# Patient Record
Sex: FEMALE | URBAN - METROPOLITAN AREA
[De-identification: names, ages, dates, MRNs, and addresses within clinical notes are randomized per-mention and may not be internally consistent; named-entity substitution may affect disease eponyms.]

---

## 2018-04-17 ENCOUNTER — ALLIED HEALTH/NURSE VISIT (OUTPATIENT)
Dept: CARDIOLOGY | Facility: CLINIC | Age: 58
End: 2018-04-17
Attending: PSYCHIATRY & NEUROLOGY
Payer: COMMERCIAL

## 2018-04-17 ENCOUNTER — OFFICE VISIT (OUTPATIENT)
Dept: NEUROLOGY | Facility: CLINIC | Age: 58
End: 2018-04-17
Payer: COMMERCIAL

## 2018-04-17 VITALS
WEIGHT: 241.5 LBS | HEART RATE: 88 BPM | HEIGHT: 64 IN | SYSTOLIC BLOOD PRESSURE: 142 MMHG | DIASTOLIC BLOOD PRESSURE: 87 MMHG | BODY MASS INDEX: 41.23 KG/M2

## 2018-04-17 DIAGNOSIS — R55 PRE-SYNCOPE: ICD-10-CM

## 2018-04-17 DIAGNOSIS — R55 PRE-SYNCOPE: Primary | ICD-10-CM

## 2018-04-17 RX ORDER — ALBUTEROL SULFATE 90 UG/1
1-2 AEROSOL, METERED RESPIRATORY (INHALATION) EVERY 4 HOURS PRN
COMMUNITY
Start: 2018-03-02 | End: 2019-03-02

## 2018-04-17 RX ORDER — EPINEPHRINE 0.3 MG/.3ML
0.3 INJECTION SUBCUTANEOUS
COMMUNITY
Start: 2017-09-28

## 2018-04-17 RX ORDER — IBUPROFEN 200 MG
TABLET ORAL
COMMUNITY

## 2018-04-17 ASSESSMENT — ENCOUNTER SYMPTOMS
DISTURBANCES IN COORDINATION: 0
MUSCLE CRAMPS: 0
DYSURIA: 0
LIGHT-HEADEDNESS: 1
DIZZINESS: 1
ORTHOPNEA: 0
MYALGIAS: 0
DEPRESSION: 0
MEMORY LOSS: 0
DECREASED APPETITE: 0
NIGHT SWEATS: 1
SNORES LOUDLY: 0
EYE IRRITATION: 1
POLYDIPSIA: 0
COUGH: 1
SWOLLEN GLANDS: 0
BLOOD IN STOOL: 0
FEVER: 0
VOMITING: 0
TINGLING: 0
NAUSEA: 1
HYPERTENSION: 0
INSOMNIA: 1
WEIGHT LOSS: 0
TROUBLE SWALLOWING: 0
EYE PAIN: 1
FLANK PAIN: 0
ARTHRALGIAS: 1
SPEECH CHANGE: 0
ABDOMINAL PAIN: 1
FATIGUE: 1
BACK PAIN: 1
ALTERED TEMPERATURE REGULATION: 1
PALPITATIONS: 0
PARALYSIS: 0
POLYPHAGIA: 0
EYE WATERING: 1
DIARRHEA: 1
SINUS CONGESTION: 1
LEG PAIN: 1
WEAKNESS: 1
SORE THROAT: 0
DYSPNEA ON EXERTION: 1
TASTE DISTURBANCE: 0
EXERCISE INTOLERANCE: 0
INCREASED ENERGY: 0
HYPOTENSION: 0
DECREASED CONCENTRATION: 1
NUMBNESS: 0
MUSCLE WEAKNESS: 0
SKIN CHANGES: 0
JOINT SWELLING: 1
POSTURAL DYSPNEA: 0
POOR WOUND HEALING: 1
HOARSE VOICE: 0
NERVOUS/ANXIOUS: 1
NAIL CHANGES: 0
LOSS OF CONSCIOUSNESS: 0
NECK MASS: 1
SMELL DISTURBANCE: 0
NECK PAIN: 1
STIFFNESS: 1
HALLUCINATIONS: 0
PANIC: 0
JAUNDICE: 0
SLEEP DISTURBANCES DUE TO BREATHING: 0
CHILLS: 1
SPUTUM PRODUCTION: 1
COUGH DISTURBING SLEEP: 1
CONSTIPATION: 0
HEARTBURN: 0
RECTAL PAIN: 0
BOWEL INCONTINENCE: 1
SHORTNESS OF BREATH: 0
WEIGHT GAIN: 0
EYE REDNESS: 1
HEMATURIA: 0
DIFFICULTY URINATING: 1
TREMORS: 0
SEIZURES: 0
HEADACHES: 1
SINUS PAIN: 1
BLOATING: 1
DOUBLE VISION: 0
SYNCOPE: 1
BRUISES/BLEEDS EASILY: 1
HEMOPTYSIS: 0
WHEEZING: 0

## 2018-04-17 ASSESSMENT — PAIN SCALES - GENERAL: PAINLEVEL: MILD PAIN (2)

## 2018-04-17 NOTE — LETTER
"4/17/2018       RE: Debi Young  [address] [place] [address]     Dear Colleague,    Thank you for referring your patient, Debi Young, to the [facility] at [facility]. Please see a copy of my visit note below.    Service Date: 04/17/2018      REASON FOR VISIT:  Debi Young was referred for neurologic consultation on 04/17/2018.  Her chief complaint is that of recurrent spells.      HISTORY OF PRESENT ILLNESS:  The patient has had spells now over at least 2 years.  She described intermittent \"cold sweats.\"  She has a feeling as if she would \"black out\" with the spells.  These can occur intermittently but they are not daily.  She said that she has had to \"catch herself\" at times if she is upright.  This has been inconsistent.  Despite that, she does not feel weak.  The spells last anywhere from 5-30 minutes.  She cannot think of anything that provokes them.  She did tell me that if she has one she has to breathe better and just wait.  She said they typically last between 5 and 30 minutes.  She could not really tell me if she has a sense of spinning with them, but she has a feeling as if her \"body would go.\"  She has never associated her spells then with true vertigo.  She has not had them associated with fasting.  They do not relate directly to eating.  She does not have associated headache, upper respiratory tract infection, diplopia, oscillopsia, visual obscuration, but she has noted she has a feeling as if she is moving to the left at times.  She has had this for 3-4 days over the last 2 months.  It was hard for her to describe this also.  She has noted though a sensation the last 6 months with what she said were \"eye floaters\" or \"light flashes.\"  She denied any true scintillating scotomata.  The patient has not had any history of hypertension.  She insists that her spells have generally not been orthostatic.  She said that is a different " feeling.  She does feel chronically tired, but she does not snore.  She does suffer from obesity.  She has had a history of lower back pain.  The patient has had also issues with allergies in general and has taken allergy shots but these have not helped her complaints.  Review through the [employer] website of her problems indicate that she has had chronic allergic rhinitis and specific allergies to cats, dust mites and molds.  She has prediabetes.  The patient has had prior streptococcal pharyngitis.  She does have chronic leg edema.  The patient has a low HDL.  She has had an anaphylactic reaction on 06/27/2017.  The patient did have a basal cell cancer removed from her back in 2017.  She has had a history of chronic urticaria.  She also has had diarrheal complaints.  She has mixed urge and stress incontinence.  The patient does have synovial cysts involving both popliteal spaces.  She did tell me that she has had asthma as a child.  She did weigh in high school 125 pounds.  She is 5 feet 4 inches tall.  She said that her highest weight has been 252 pounds and she has tried to diet down to 241 pounds.  She does carry an EpiPen.  The patient has not had to use it.  She does take p.r.n. rare ibuprofen for pain.  She has been told to take low dose aspirin, but she tends to forget it.  The patient has been treated with albuterol at times when she has had a cough which is not common.      ALLERGIES:   She has allergies listed to adhesive tape, amoxicillin, Augmentin, Bactrim, contrast dye, erythromycin, naproxen and prednisone which did cause her to have anaphylaxis on 05/30/2017.        FAMILY HISTORY:   The patient's family history is significant in that 1 brother has hereditary spastic paraplegia.  She said a sister has had psoriasis and breast cancer.  The patient's father is alive and has had hypertension as is her mother who is alive in her 80s also.        The patient has never smoked.  She rarely uses alcohol.  She  did have a prior needle aspiration of a nodule on 04/10/2018 of the thyroid.  This was uncomplicated.  There was no evidence of malignancy.  The patient has a known multinodular thyroid.  She had carotid ultrasound done on 03/26/2018.  This was basically unremarkable.  She had nonocclusive proximal bilateral ICA plaque.  The patient was found at that time to have hypoechoic isthmus thyroid nodule incidentally found.  She had an MRI scan of the brain which I reviewed with her also on 03/26/2018.  This showed normal intracranial contents.  She had diffuse T1 hypointensity throughout the calvarium, which may related to red marrow reconversion.  The radiologist said this could relate to chronic anemia, heart failure, or heavy smoking, polycythemia vera, granulomatous process or hematologic malignancy.  There was an incidental note of a prominent perivascular space involving the left putamen.  The patient did have a mild amount of fluid involving the right mastoid air cells which relate to mastoiditis and chronic eustachian tube dysfunction.  The patient did have a study with and without contrast.  The patient did have a chest x-ray which showed no abnormality.  Her magnesium level was 2.3 on 03/21/2018.  She had normal or negative troponins then and her complete blood count was completely unremarkable.  She also had a normal metabolic profile.  Her TSH level was 1.37.  She was checked on 03/02/2018 for influenza and had negative serology for it.      The patient has not consistently taken any medication nor supplement.  She has normal diet and she denied any significant change and bowel habits.  She has not had any actual anabel menopausal symptoms.  The patient did tell me that she went through menopause at age 49.      PHYSICAL EXAMINATION:  Neurologic examination was completely unremarkable.  This included gait, station, cerebellar testing, muscle stretch reflexes, plantar stimulation, strength, cranial nerve  "examination, superficial and cortical sensory testing are all unremarkable.  I could not auscultate cervical or cerebral bruits.  She had a regular cardiac rhythm.  The patient had a negative Nylen-Barany test.     VITAL SIGNS:   Her blood pressure supine was 151/73 with a pulse of 78.  Seated it was 143/80 with a pulse of 76 and standing it was 161/87 with a pulse of 83, waiting 3 minutes be between each blood pressure determination.     She had no cervical bruits and a regular cardiac rhythm without gallops or murmurs.      IMPRESSION:  Unusual spells which do seem to relate to a \"cold and sweaty sensation.\"      The patient has had unusual spells.  I have ordered VMAs metanephrines.  She will have a Holter monitor placed and I have asked for formal Cardiology consultation.  She is going to send her MRI scans here for review, so the abnormal skull portion of the MRI scan could be reviewed by one of our neuroradiologists.  I told her I would be happy to see her again, but I did not feel that she probably had a neurologic cause for her symptoms.  She did wonder too if she possibly is premenopausal.  I did talk with her about possibly having endocrine consultation here.      Thank you for allowing me to see this patient.        I spent 1 hour with her.  Over 50% of the time this involved counseling and coordination of care.  A complete review of medical systems was done and a positive review is listed in the report above.        cc:   [address]   [place]   [address]       Addendum: Our neuroradiologist reviewed abnormal calvarium on recent MRI and agrees with report and recommends hematology oncology consult at [facility]           D: 2018   T: 2018   MT: SARAH      Name:     TRAVIS LUNA   MRN:      [id number]        Account:      [id number]   :      1960           Service Date: 2018      Document: [id number]        Again, thank you for allowing me to " participate in the care of your patient.      Sincerely,    Ronan Griggs MD

## 2018-04-17 NOTE — MR AVS SNAPSHOT
After Visit Summary   4/17/2018    Debi Young    MRN: [id number]           Patient Information     Date Of Birth          1960        Visit Information        Provider Department      4/17/2018 7:00 AM Ronan Griggs MD [facility] Neurology        Today's Diagnoses     Pre-syncope    -  1       Follow-ups after your visit        Additional Services     CARDIOLOGY EVAL ADULT REFERRAL       Preferred location:  [facility] [phone]   [email or web address] stress test and tilt table    Please be aware that coverage of these services is subject to the terms and limitations of your health insurance plan.  Call member services at your health plan with any benefit or coverage questions.      Please bring the following to your appointment:  Any x-rays, CTs or MRIs which have been performed. Contact the facility where they were done to arrange for  prior to your scheduled appointment.    List of current medications  This referral request   Any documents/labs given to you for this referral                  Your next 10 appointments already scheduled     Apr 17, 2018 10:30 AM CDT   Holter Monitor with MG DEVICE RM, MG CV TECH   [facility]    [address] [place] [id number] [phone]            Apr 30, 2018  2:30 PM CDT   New Visit with Eugene Collier MD   [facility]    [id number] [place] [id number] [phone]              Future tests that were ordered for you today     Open Future Orders        Priority Expected Expires Ordered    Holter monitor 48 hour - Adult Routine 4/17/2018 10/14/2018 4/17/2018    Metanephrine random or 24 hr urine Routine 4/17/2018 5/30/2018 4/17/2018    Catecholamines fractioned free urine Routine  4/18/2019  "2018    VMA urine Routine  2019            Who to contact     Please call your clinic at [phone] to:    Ask questions about your health    Make or cancel appointments    Discuss your medicines    Learn about your test results    Speak to your doctor            Additional Information About Your Visit        MyChart Information     [employer] is an electronic gateway that provides easy, online access to your medical records. With [employer], you can request a clinic appointment, read your test results, renew a prescription or communicate with your care team.     To sign up for [employer] visit the website at [email or web address]   You will be asked to enter the access code listed below, as well as some personal information. Please follow the directions to create your username and password.     Your access code is: [email or web address]  Expires: 2018  6:31 AM     Your access code will  in 90 days. If you need help or a new code, please contact your [facility] or call [phone] for assistance.        Care EveryWhere ID     This is your Care EveryWhere ID. This could be used by other organizations to access your [place] medical records  [id number]        Your Vitals Were     Pulse Height BMI (Body Mass Index)             88 1.626 m (5' 4\") 41.45 kg/m2          Blood Pressure from Last 3 Encounters:   18 142/87    Weight from Last 3 Encounters:   18 109.5 kg (241 lb 8 oz)              We Performed the Following     CARDIOLOGY EVAL ADULT REFERRAL        Primary Care Provider    None Specified       No primary provider on file.        Equal Access to Services     [facility]: Hadii aad ku hadasho Soomaali, waaxda luqadaha, qaybta kaalmada adeegyada, mirna kent . So [facility] [phone].    ATENCIÓN: Si habla español, tiene a garcia disposición servicios gratuitos de asistencia lingüística. Llame al [phone].    We comply " with applicable federal civil rights laws and [place] laws. We do not discriminate on the basis of race, color, national origin, age, disability, sex, sexual orientation, or gender identity.            Thank you!     Thank you for choosing [facility]UR[facility]  for your care. Our goal is always to provide you with excellent care. Hearing back from our patients is one way we can continue to improve our services. Please take a few minutes to complete the written survey that you may receive in the mail after your visit with us. Thank you!             Your Updated Medication List - Protect others around you: Learn how to safely use, store and throw away your medicines at www.disposemymeds.org.          This list is accurate as of 4/17/18  8:13 AM.  Always use your most recent med list.                   Brand Name Dispense Instructions for use Diagnosis    albuterol 108 (90 Base) MCG/ACT Inhaler    PROAIR HFA/PROVENTIL HFA/VENTOLIN HFA     Inhale 1-2 puffs into the lungs every 4 hours as needed        EPINEPHrine 0.3 MG/0.3ML injection 2-pack    EPIPEN/ADRENACLICK/or ANY BX GENERIC EQUIV     Inject 0.3 mg into the muscle        ibuprofen 200 MG tablet    ADVIL/MOTRIN

## 2018-04-19 ENCOUNTER — PRE VISIT (OUTPATIENT)
Dept: CARDIOLOGY | Facility: CLINIC | Age: 58
End: 2018-04-19

## 2018-04-19 DIAGNOSIS — R55 PRE-SYNCOPE: ICD-10-CM

## 2018-04-19 PROCEDURE — 99000 SPECIMEN HANDLING OFFICE-LAB: CPT | Performed by: PSYCHIATRY & NEUROLOGY

## 2018-04-19 PROCEDURE — 83835 ASSAY OF METANEPHRINES: CPT | Mod: 90 | Performed by: PSYCHIATRY & NEUROLOGY

## 2018-04-19 PROCEDURE — 84585 ASSAY OF URINE VMA: CPT | Performed by: PSYCHIATRY & NEUROLOGY

## 2018-04-19 PROCEDURE — 82384 ASSAY THREE CATECHOLAMINES: CPT | Mod: 90 | Performed by: PSYCHIATRY & NEUROLOGY

## 2018-04-19 NOTE — TELEPHONE ENCOUNTER
Left message for patient asking her to call back and complete pre visit phone call.Patient returned holter monitor today to [facility] CHRIS.     PREVISIT INFORMATION                                                    Debi Young scheduled for future visit at [facility] specialty clinics.    Patient is scheduled to see Amira on 4/30/18  Reason for visit: pre syncope  Referring provider Dr Griggs  Has patient seen previous specialist? No  Medical Records:  Available in chart.  Patient was previously seen at a [facility] or [facility] facility.    REVIEW                                                      New patient packet mailed to patient: No  Medication reconciliation complete: Yes      Current Outpatient Prescriptions   Medication Sig Dispense Refill     albuterol (PROAIR HFA/PROVENTIL HFA/VENTOLIN HFA) 108 (90 Base) MCG/ACT Inhaler Inhale 1-2 puffs into the lungs every 4 hours as needed       EPINEPHrine (EPIPEN/ADRENACLICK/OR ANY BX GENERIC EQUIV) 0.3 MG/0.3ML injection 2-pack Inject 0.3 mg into the muscle       ibuprofen (ADVIL/MOTRIN) 200 MG tablet          Allergies: Prednisone; Naproxen; No clinical screening - see comments; Tape [adhesive tape]; Amoxicillin-pot clavulanate; Contrast dye; Erythromycin; Sulfa drugs; and Sulfamethoxazole-trimethoprim        PLAN/FOLLOW-UP NEEDED                                                      Previsit review complete.  Patient will see provider at future scheduled appointment.     Patient Reminders Given:  If you need to cancel or reschedule,please call [phone].  Clinic location reviewed.    Guerline Hensley

## 2018-04-20 LAB — VMA/CREAT UR: 3.1 MG/G CR (ref 0–8.1)

## 2018-04-21 LAB
COLLECT DURATION TIME UR: 24 HR
CREAT 24H UR-MRATE: 1170 MG/D (ref 500–1400)
CREAT SERPL-MCNC: 72 MG/DL
METANEPH 24H UR-MCNC: 44 UG/L
METANEPH 24H UR-MRATE: 72 UG/D (ref 39–143)
METANEPH+NORMETANEPH UR-IMP: NORMAL
METANEPH/CREAT 24H UR: 61 UG/G CRT (ref 0–300)
NORMETANEPHRINE 24H UR-MCNC: 228 UG/L
NORMETANEPHRINE 24H UR-MRATE: 370 UG/D (ref 109–393)
NORMETANEPHRINE/CREAT 24H UR: 317 UG/G CRT (ref 0–400)
SPECIMEN VOL ?TM UR: 1625 ML

## 2018-04-23 LAB
CATECHOLS UR-IMP: NORMAL
COLLECT DURATION TIME UR: 24 HR
CREAT 24H UR-MRATE: 1170 MG/D (ref 500–1400)
CREAT UR-MCNC: 72 MG/DL
DOPAMINE 24H UR-MRATE: 224 UG/D (ref 77–324)
DOPAMINE UR-MCNC: 2 UG/L
DOPAMINE/CREAT UR: 192 UG/G CRT (ref 0–250)
EPINEPH 24H UR-MRATE: 3 UG/D (ref 1–7)
EPINEPH UR-MCNC: 138 UG/L
EPINEPH/CREAT UR: 3 UG/G CRT (ref 0–20)
NOREPINEPH 24H UR-MRATE: 52 UG/D (ref 16–71)
NOREPINEPH UR-MCNC: 32 UG/L
NOREPINEPH/CREAT UR: 44 UG/G CRT (ref 0–45)
SPECIMEN VOL ?TM UR: 1625 ML

## 2018-04-24 LAB — INTERPRETATION MONITOR -MUSE: NORMAL

## 2018-04-25 ENCOUNTER — CARE COORDINATION (OUTPATIENT)
Dept: NEUROLOGY | Facility: CLINIC | Age: 58
End: 2018-04-25

## 2018-04-25 ENCOUNTER — HEALTH MAINTENANCE LETTER (OUTPATIENT)
Age: 58
End: 2018-04-25

## 2018-04-25 NOTE — PROGRESS NOTES
Ronan Griggs MD McAllister, Angela, SUMMER                   It looks like urine catecholamines are okay.       Ronan Griggs MD McAllister, Angela, SUMMER                   Please tell her Holter essentially normal; didn't correlate to sx's.         4/25/18: called patient with above results. Patient verbalized understanding. She has no further questions or concerns at this time.

## 2018-04-30 ENCOUNTER — OFFICE VISIT (OUTPATIENT)
Dept: CARDIOLOGY | Facility: CLINIC | Age: 58
End: 2018-04-30
Attending: PSYCHIATRY & NEUROLOGY
Payer: COMMERCIAL

## 2018-04-30 VITALS
OXYGEN SATURATION: 96 % | WEIGHT: 243 LBS | BODY MASS INDEX: 41.71 KG/M2 | SYSTOLIC BLOOD PRESSURE: 151 MMHG | DIASTOLIC BLOOD PRESSURE: 91 MMHG | HEART RATE: 82 BPM

## 2018-04-30 DIAGNOSIS — R42 LIGHTHEADEDNESS: Primary | ICD-10-CM

## 2018-04-30 DIAGNOSIS — R55 PRE-SYNCOPE: ICD-10-CM

## 2018-04-30 PROCEDURE — 93000 ELECTROCARDIOGRAM COMPLETE: CPT | Performed by: INTERNAL MEDICINE

## 2018-04-30 PROCEDURE — 99204 OFFICE O/P NEW MOD 45 MIN: CPT | Mod: 25 | Performed by: INTERNAL MEDICINE

## 2018-04-30 ASSESSMENT — PAIN SCALES - GENERAL: PAINLEVEL: NO PAIN (0)

## 2018-04-30 NOTE — PATIENT INSTRUCTIONS
It was a pleasure to see you in the cardiology clinic today.    If you have any questions, you can reach my nurse, Guerline Hensley, at [phone].     Note the new medications: None    Stop the following medications: None    The results from today include: None    Tests ordered today:   1. ECHO  2. E-Cardio Patch Event Monitor (30 days): Assess for arrhythmias.    Additonal Instruction:  Preventing Vasovagal Syncope    I would like you to follow up with your primary care provider if the results of the studies are negative.    Sincerely,      Eugene Collier MD     [facility]

## 2018-04-30 NOTE — PROGRESS NOTES
"CARDIOLOGY CONSULTATION    Referring Provider:  Ronan Griggs  Primary Care Provider:   No Ref-Primary, Physician  Indication for Consultation:  Presyncope    HPI: Debi Young is a 57 year old female being seen today for evaluation of presyncope.   The patient's risk factor profile is: (-) HTN, (-) DM, (-) hypercholesterolemia, (-) tobacco use, (-) fam Hx premature CAD.  The patient has no history of cardiovascular disease (CAD, CHF, arrhythmia, valvular heart disease).  The patient has no Hx of PAD or cerebrovascular disease.  The patient has not undergone prior cardiovascular evaluation and has never had an stress study, cardiac catheterization, or EP study. She had a normal ECHO in 2008.  She had been in her good state of health until 2 months ago when she recalls awakening to go to the bathroom to urinate.  She had no symptoms prior to or during urination.  After returning to bed and lying down, she developed a cold sweat.  It lasted 3-4 minutes.  No visual disturbance.  Generalized headache.  No CP, SOB.  (+) Nausea. (-) Vomiting.      She has had recurrent episodes over the past 2 months, with episodes occurring multiple times a week and sometimes recurring over the course of the day.  When the symptoms recur, she tries to sit down quickly to avoid passing out.  No specific triggers.  Unrelated to exertion.  Unrelated to meals.  She has not had an episode in past 11 days.    The patient denies a history of PND, orthopnea.  She notes dependent pedal edema, improved with leg elevation.  She denies palpitations and syncope.    She saw her PCP and had a normal ECG.  She had a 48 hour Holter on Apr 17, 2018  The patient ported one symptom of \"lightheaded, Diaphoretic\" which did not correlate with any ECG changes.          PAST MEDICAL HISTORY:  No past medical history on file.    CURRENT MEDICATIONS:  Current Outpatient Prescriptions   Medication Sig Dispense Refill     albuterol (PROAIR HFA/PROVENTIL " HFA/VENTOLIN HFA) 108 (90 Base) MCG/ACT Inhaler Inhale 1-2 puffs into the lungs every 4 hours as needed       EPINEPHrine (EPIPEN/ADRENACLICK/OR ANY BX GENERIC EQUIV) 0.3 MG/0.3ML injection 2-pack Inject 0.3 mg into the muscle       ibuprofen (ADVIL/MOTRIN) 200 MG tablet          PAST SURGICAL HISTORY:  No past surgical history on file.    ALLERGIES  Prednisone; Naproxen; No clinical screening - see comments; Tape [adhesive tape]; Amoxicillin-pot clavulanate; Contrast dye; Erythromycin; Sulfa drugs; and Sulfamethoxazole-trimethoprim    FAMILY HX:  No family history on file.    SOCIAL HX:  Social History     Social History     Marital status:      Spouse name: N/A     Number of children: N/A     Years of education: N/A     Social History Main Topics     Smoking status: Never Smoker     Smokeless tobacco: Never Used     Alcohol use Not on file     Drug use: Not on file     Sexual activity: Not on file     Other Topics Concern     Not on file     Social History Narrative     No narrative on file       ROS:  Constitutional: No fever, chills, or sweats. No weight gain/loss.   HEENT: No visual disturbance, ear ache, epistaxis, sore throat.   Allergies/Immunologic: Negative.   Respiratory: No cough, hemoptysis.   Cardiovascular: As per HPI.   GI: No nausea, vomiting, hematemesis, melena, or hematochezia.   : No urinary frequency, dysuria, or hematuria.   Integument: No rash.   Psychiatric: No anxiety / depression.   Neuro: No speech disturbance, focal sensory or motor deficit.   Endocrinology: No polyuria / polyphagia.   Musculoskeletal: No myalgia.    VITAL SIGNS:  /84 (BP Location: Left arm, Patient Position: Chair, Cuff Size: Adult Large)  Pulse 86  Wt 110.2 kg (243 lb)  SpO2 96%  BMI 41.71 kg/m2  Body mass index is 41.71 kg/(m^2).  Wt Readings from Last 2 Encounters:   04/17/18 109.5 kg (241 lb 8 oz)       PHYSICAL EXAM  Debi Young is a 57 year old female.in no acute distress.  HEENT: Eyes  "Nonicteric.  Neck: JVP normal.  Carotids +3/3 bilaterally without bruits.  Lungs: CTA.  Cor: RRR. Normal S1 and S2.  No murmur, rub, or gallop.  PMI in Lf 5th ICS.  Abd: Soft, nontender, nondistended.  NABS.  No pulsatile mass.  Extremities: No C/C/E.  Pulses +3/3 symmetric in upper and lower extremities.  Neuro: Grossly intact.  Psych: A&O x 3.  Skin: No rash.    LABS  None    HOLTER MONITOR: 2018  Interpretation summary:  1.Thr rhythm was sinus throughout.  2. The heart rate varied with a minimum rate of 71 bpm, maximum rate 133 bpm, average rate of 85 bpm.  3. Six supraventricular ectopic beats were noted.  4. No ventricular ectopic beats were noted.  5. No ST-T wave changes.  6. The patient ported one symptom of \"lightheaded, Diaphoretic\" which did not correlate with any ECG changes.    EK18  Normal sinus rhythm  Normal ECG    EK2018 - [facility]  Normal sinus rhythm  Normal ECG  When compared with ECG of 10-NOV-2014 10:19, no significant change was found    ECHO: 2008 - [facility]  1. Normal left ventricular size and systolic function.   Ejection fraction of 60-65%.  2. Trace tricuspid regurgitation with normal right-sided pressure.  3. No prior studies for comparison.    NICS:  3/26/18  CONCLUSION:    1.  Normal waveforms and velocities in the right carotid system without stenosis or occlusion.  2.  Normal waveforms and velocities in the left carotid system without stenosis or occlusion.  3.  The vertebral arteries show antegrade flow bilaterally.  4.  Nonocclusive proximal bilateral ICA plaque.  5.  11 mm hypoechoic isthmus thyroid nodule incidentally noted.  Recommend dedicated Thyroid US to evaluate.    MRI BRAIN  3/26/18  CONCLUSION:    1.  No acute intracranial abnormality or abnormal intracranial enhancement.  2.  Diffuse T1 hypointensity throughout the calvarium which may be related to red marrow reconversion.  This could relate to chronic anemia, heart failure, or heavy " smoking.  Polycythemia vera, granulomatous processes, and hematologic malignancies can also give a similar imaging appearance.  3.  Incidental note of a prominent perivascular space involving the left putamen.  4.  Mild amount of fluid involving the right mastoid air cells may relate to mastoiditis versus   chronic eustachian tube dysfunction.    STRESS TEST:  None    CARDIAC CATH: None    ASSESSMENT AND PLAN:   1. Presyncope.  No history of cardiovascular disease.  There are components to suggest vasovagal syncope (i.e. Micturition) but the symptoms are not consistent.  She has had negative NICS.  I noted the abnormal MRI but have to defer to Neurology.  I would like to get ECHO to rule out structural heart disease and ensure the LV function is normal.  I will also get a 30 day event monitor to see if we can capture either bradycardia, arrhythmia, or advanced conduction block.  Check orthostatics in the clinic today.  Provide patietn with instructions regarding vasovagal syncope.    FOLLOW UP:  If workup negative, follow up with neurology and hematology.    ECHO: (5/21/18)  No pathologic structural basis for arrhythmia/syncope identified.  Normal biventricular size, thickness, global, and regional systolic function, LVEF=60-65%.  Estimated pulmonary artery systolic pressure is normal.  No significant valvular abnormalities are noted.  Normal inferior vena cava with normal respiratory variation (estimated RA pressure 3 mmHg.)  No pericardial effusion.  Previous study not available for comparison.    Heart Monitor (May 2017): No arrhythmias    Eugene Collier MD    Divisions of Cardiology  [place], [place]    CC  Patient Care Team:  No Ref-Primary, Physician as PCP - General  Charlotte Griggs MD as MD (Neurology)  Rosaline Bonilla, SUMMER as Nurse Coordinator (Neurology)  Yoko Hurley, RN as Nurse Coordinator (Neurology)  CHARLOTTE GRIGGS

## 2018-04-30 NOTE — NURSING NOTE
"Debi Young's goals for this visit include:   Chief Complaint   Patient presents with     Consult     Pre-syncope       She requests these members of her care team be copied on today's visit information: PCP    PCP: No Ref-Primary, Physician    Referring Provider:  Ronan Griggs MD  [facility] NEUROLOGY  [address] [place] [id number]    Chief Complaint   Patient presents with     Consult     Pre-syncope       Initial /84 (BP Location: Left arm, Patient Position: Chair, Cuff Size: Adult Large)  Pulse 86  Wt 110.2 kg (243 lb)  SpO2 96%  BMI 41.71 kg/m2 Estimated body mass index is 41.71 kg/(m^2) as calculated from the following:    Height as of 4/17/18: 1.626 m (5' 4\").    Weight as of this encounter: 110.2 kg (243 lb).  Medication Reconciliation: complete         Medication Refills: none        Lorie Krueger CMA        "

## 2018-04-30 NOTE — NURSING NOTE
Orthostatics  Blood pressure and Pulse:   Supine  129/84   69  Sitting  138/87   74  Standing   151/91   82      Lorie Krueger, CMA

## 2018-04-30 NOTE — MR AVS SNAPSHOT
After Visit Summary   4/30/2018    Debi Young    MRN: [id number]           Patient Information     Date Of Birth          1960        Visit Information        Provider Department      4/30/2018 2:30 PM Eugene Collier MD [facility]        Today's Diagnoses     Lightheadedness    -  1    Pre-syncope          Care Instructions    It was a pleasure to see you in the cardiology clinic today.    If you have any questions, you can reach my nurse, Guerline Hensley, at [phone].     Note the new medications: None    Stop the following medications: None    The results from today include: None    Tests ordered today:   1. ECHO  2. E-Cardio Patch Event Monitor (30 days): Assess for arrhythmias.    Additonal Instruction:  Preventing Vasovagal Syncope    I would like you to follow up with your primary care provider if the results of the studies are negative.    Sincerely,      Eugene Collier MD     [facility]              Follow-ups after your visit        Future tests that were ordered for you today     Open Future Orders        Priority Expected Expires Ordered    Cardiac Event Monitor - Peds/Adult Routine  6/14/2018 4/30/2018    Echocardiogram Complete Routine  4/30/2019 4/30/2018            Who to contact     If you have questions or need follow up information about today's clinic visit or your schedule please contact [facility] directly at [phone].  Normal or non-critical lab and imaging results will be communicated to you by MyChart, letter or phone within 4 business days after the clinic has received the results. If you do not hear from us within 7 days, please contact the clinic through MyChart or phone. If you have a critical or abnormal lab result, we will notify you by phone as soon as possible.  Submit refill requests through [employer] or call your pharmacy and they will forward the refill request to us.  Please allow 3 business days for your refill to be completed.          Additional Information About Your Visit        [employer] Information     [email or web address] gives you secure access to your electronic health record. If you see a primary care provider, you can also send messages to your care team and make appointments. If you have questions, please call your primary care clinic.  If you do not have a primary care provider, please call [phone] and they will assist you.      [email or web address] is an electronic gateway that provides easy, online access to your medical records. With [email or web address], you can request a clinic appointment, read your test results, renew a prescription or communicate with your care team.     To access your existing account, please contact your [facility] or call [phone] for assistance.        Care EveryWhere ID     This is your Care EveryWhere ID. This could be used by other organizations to access your [facility] medical records  [id number]        Your Vitals Were     Pulse Pulse Oximetry BMI (Body Mass Index)             82 96% 41.71 kg/m2          Blood Pressure from Last 3 Encounters:   04/30/18 (!) 151/91   04/17/18 142/87    Weight from Last 3 Encounters:   04/30/18 110.2 kg (243 lb)   04/17/18 109.5 kg (241 lb 8 oz)              We Performed the Following     EKG 12-lead complete w/read - Clinics        Primary Care Provider Fax #    Physician No Ref-Primary [phone]       No address on file        Equal Access to Services     MESHA WORKMAN : Hadii flora woodard hadasho Soomaali, waaxda luqadaha, qaybta kaalmada giorgi, mirna kent . So [facility] [phone].    ATENCIÓN: Si habla español, tiene a garcia disposición servicios gratuitos de asistencia lingüística. Shalmo al [phone].    We comply with applicable federal civil rights laws and [place] laws. We do not discriminate on the basis of race, color, national origin, age, disability, sex,  sexual orientation, or gender identity.            Thank you!     Thank you for choosing [facility]  for your care. Our goal is always to provide you with excellent care. Hearing back from our patients is one way we can continue to improve our services. Please take a few minutes to complete the written survey that you may receive in the mail after your visit with us. Thank you!             Your Updated Medication List - Protect others around you: Learn how to safely use, store and throw away your medicines at www.disposemymeds.org.          This list is accurate as of 4/30/18  3:34 PM.  Always use your most recent med list.                   Brand Name Dispense Instructions for use Diagnosis    albuterol 108 (90 Base) MCG/ACT Inhaler    PROAIR HFA/PROVENTIL HFA/VENTOLIN HFA     Inhale 1-2 puffs into the lungs every 4 hours as needed        EPINEPHrine 0.3 MG/0.3ML injection 2-pack    EPIPEN/ADRENACLICK/or ANY BX GENERIC EQUIV     Inject 0.3 mg into the muscle        ibuprofen 200 MG tablet    ADVIL/MOTRIN

## 2018-05-01 NOTE — PROGRESS NOTES
"Service Date: 04/17/2018      REASON FOR VISIT:  Debi Young was referred for neurologic consultation on 04/17/2018.  Her chief complaint is that of recurrent spells.      HISTORY OF PRESENT ILLNESS:  The patient has had spells now over at least 2 years.  She described intermittent \"cold sweats.\"  She has a feeling as if she would \"black out\" with the spells.  These can occur intermittently but they are not daily.  She said that she has had to \"catch herself\" at times if she is upright.  This has been inconsistent.  Despite that, she does not feel weak.  The spells last anywhere from 5-30 minutes.  She cannot think of anything that provokes them.  She did tell me that if she has one she has to breathe better and just wait.  She said they typically last between 5 and 30 minutes.  She could not really tell me if she has a sense of spinning with them, but she has a feeling as if her \"body would go.\"  She has never associated her spells then with true vertigo.  She has not had them associated with fasting.  They do not relate directly to eating.  She does not have associated headache, upper respiratory tract infection, diplopia, oscillopsia, visual obscuration, but she has noted she has a feeling as if she is moving to the left at times.  She has had this for 3-4 days over the last 2 months.  It was hard for her to describe this also.  She has noted though a sensation the last 6 months with what she said were \"eye floaters\" or \"light flashes.\"  She denied any true scintillating scotomata.  The patient has not had any history of hypertension.  She insists that her spells have generally not been orthostatic.  She said that is a different feeling.  She does feel chronically tired, but she does not snore.  She does suffer from obesity.  She has had a history of lower back pain.  The patient has had also issues with allergies in general and has taken allergy shots but these have not helped her complaints.  Review through " the [employer] website of her problems indicate that she has had chronic allergic rhinitis and specific allergies to cats, dust mites and molds.  She has prediabetes.  The patient has had prior streptococcal pharyngitis.  She does have chronic leg edema.  The patient has a low HDL.  She has had an anaphylactic reaction on 06/27/2017.  The patient did have a basal cell cancer removed from her back in 2017.  She has had a history of chronic urticaria.  She also has had diarrheal complaints.  She has mixed urge and stress incontinence.  The patient does have synovial cysts involving both popliteal spaces.  She did tell me that she has had asthma as a child.  She did weigh in high school 125 pounds.  She is 5 feet 4 inches tall.  She said that her highest weight has been 252 pounds and she has tried to diet down to 241 pounds.  She does carry an EpiPen.  The patient has not had to use it.  She does take p.r.n. rare ibuprofen for pain.  She has been told to take low dose aspirin, but she tends to forget it.  The patient has been treated with albuterol at times when she has had a cough which is not common.      ALLERGIES:   She has allergies listed to adhesive tape, amoxicillin, Augmentin, Bactrim, contrast dye, erythromycin, naproxen and prednisone which did cause her to have anaphylaxis on 05/30/2017.        FAMILY HISTORY:   The patient's family history is significant in that 1 brother has hereditary spastic paraplegia.  She said a sister has had psoriasis and breast cancer.  The patient's father is alive and has had hypertension as is her mother who is alive in her 80s also.        The patient has never smoked.  She rarely uses alcohol.  She did have a prior needle aspiration of a nodule on 04/10/2018 of the thyroid.  This was uncomplicated.  There was no evidence of malignancy.  The patient has a known multinodular thyroid.  She had carotid ultrasound done on 03/26/2018.  This was basically unremarkable.  She had  nonocclusive proximal bilateral ICA plaque.  The patient was found at that time to have hypoechoic isthmus thyroid nodule incidentally found.  She had an MRI scan of the brain which I reviewed with her also on 03/26/2018.  This showed normal intracranial contents.  She had diffuse T1 hypointensity throughout the calvarium, which may related to red marrow reconversion.  The radiologist said this could relate to chronic anemia, heart failure, or heavy smoking, polycythemia vera, granulomatous process or hematologic malignancy.  There was an incidental note of a prominent perivascular space involving the left putamen.  The patient did have a mild amount of fluid involving the right mastoid air cells which relate to mastoiditis and chronic eustachian tube dysfunction.  The patient did have a study with and without contrast.  The patient did have a chest x-ray which showed no abnormality.  Her magnesium level was 2.3 on 03/21/2018.  She had normal or negative troponins then and her complete blood count was completely unremarkable.  She also had a normal metabolic profile.  Her TSH level was 1.37.  She was checked on 03/02/2018 for influenza and had negative serology for it.      The patient has not consistently taken any medication nor supplement.  She has normal diet and she denied any significant change and bowel habits.  She has not had any actual anabel menopausal symptoms.  The patient did tell me that she went through menopause at age 49.      PHYSICAL EXAMINATION:  Neurologic examination was completely unremarkable.  This included gait, station, cerebellar testing, muscle stretch reflexes, plantar stimulation, strength, cranial nerve examination, superficial and cortical sensory testing are all unremarkable.  I could not auscultate cervical or cerebral bruits.  She had a regular cardiac rhythm.  The patient had a negative Nylen-Barany test.     VITAL SIGNS:   Her blood pressure supine was 151/73 with a pulse of 78.  " Seated it was 143/80 with a pulse of 76 and standing it was 161/87 with a pulse of 83, waiting 3 minutes be between each blood pressure determination.     She had no cervical bruits and a regular cardiac rhythm without gallops or murmurs.      IMPRESSION:  Unusual spells which do seem to relate to a \"cold and sweaty sensation.\"      The patient has had unusual spells.  I have ordered VMAs metanephrines.  She will have a Holter monitor placed and I have asked for formal Cardiology consultation.  She is going to send her MRI scans here for review, so the abnormal skull portion of the MRI scan could be reviewed by one of our neuroradiologists.  I told her I would be happy to see her again, but I did not feel that she probably had a neurologic cause for her symptoms.  She did wonder too if she possibly is premenopausal.  I did talk with her about possibly having endocrine consultation here.      Thank you for allowing me to see this patient.        I spent 1 hour with her.  Over 50% of the time this involved counseling and coordination of care.  A complete review of medical systems was done and a positive review is listed in the report above.      Charlotte Griggs MD      cc:   [address]   [place]   [address]       Addendum: Our neuroradiologist reviewed abnormal calvarium on recent MRI and agrees with report and recommends hematology oncology consult at [facility]  CHARLOTTE GRIGGS MD             D: 2018   T: 2018   MT: SARAH      Name:     TRAVIS LUNA   MRN:      [id number]        Account:      [id number]   :      1960           Service Date: 2018      Document: [id number]      "

## 2018-05-02 ENCOUNTER — TELEPHONE (OUTPATIENT)
Dept: CARDIOLOGY | Facility: CLINIC | Age: 58
End: 2018-05-02

## 2018-05-02 ENCOUNTER — TELEPHONE (OUTPATIENT)
Dept: NEUROLOGY | Facility: CLINIC | Age: 58
End: 2018-05-02

## 2018-05-02 DIAGNOSIS — R93.0 ABNORMAL MAGNETIC RESONANCE IMAGING OF HEAD: Primary | ICD-10-CM

## 2018-05-02 NOTE — TELEPHONE ENCOUNTER
Patient would like a call to discuss some results she received from Dr. Griggs from the [facility] prior to coming in for some tests that Dr. Collier ordered. She just recently had an MRI and was referred to a hematologist.  Please advise.

## 2018-05-02 NOTE — TELEPHONE ENCOUNTER
Left message regarding calvarium changes on MRI . Our radiologist Adriano recommended hematology oncology consult at [facility]

## 2018-05-03 NOTE — TELEPHONE ENCOUNTER
Called and spoke to patient. She states that the MRI from [facility] was not available for her appointment with Dr Griggs, but he has now reviewed it and told patient that the radiologist has recommended she see hematology. She wants to be sure that she needs to do the cardiac testing that is set up for 5/7/1.  Will route to Dr Collier.

## 2018-05-04 NOTE — TELEPHONE ENCOUNTER
Date: 5/4/2018    Time of Call: 10:17 AM     Diagnosis:  presyncope     [  ] Ordering provider: Dr Eugene Collier    Order: Hold off on the ECHO if neurology / hematology identify cause of her symptoms     Order received by: SUMMER Holder     Follow-up/additional notes: Spoke with patient. Cancelled testing for Monday. She will ask the physician to send a copy of the visit notes to Dr Collier. Orders will stay in chart for future use if needed

## 2018-05-09 ENCOUNTER — ONCOLOGY VISIT (OUTPATIENT)
Dept: ONCOLOGY | Facility: CLINIC | Age: 58
End: 2018-05-09
Payer: COMMERCIAL

## 2018-05-09 VITALS
DIASTOLIC BLOOD PRESSURE: 83 MMHG | TEMPERATURE: 98 F | RESPIRATION RATE: 15 BRPM | WEIGHT: 239 LBS | HEART RATE: 72 BPM | SYSTOLIC BLOOD PRESSURE: 137 MMHG | BODY MASS INDEX: 40.8 KG/M2 | OXYGEN SATURATION: 99 % | HEIGHT: 64 IN

## 2018-05-09 DIAGNOSIS — R55 PRE-SYNCOPE: Primary | ICD-10-CM

## 2018-05-09 LAB
BASOPHILS # BLD AUTO: 0.1 10E9/L (ref 0–0.2)
BASOPHILS NFR BLD AUTO: 0.5 %
DIFFERENTIAL METHOD BLD: NORMAL
EOSINOPHIL # BLD AUTO: 0.3 10E9/L (ref 0–0.7)
EOSINOPHIL NFR BLD AUTO: 3 %
ERYTHROCYTE [DISTWIDTH] IN BLOOD BY AUTOMATED COUNT: 13 % (ref 10–15)
HCT VFR BLD AUTO: 42.3 % (ref 35–47)
HGB BLD-MCNC: 13.8 G/DL (ref 11.7–15.7)
IMM GRANULOCYTES # BLD: 0 10E9/L (ref 0–0.4)
IMM GRANULOCYTES NFR BLD: 0.3 %
LYMPHOCYTES # BLD AUTO: 2.7 10E9/L (ref 0.8–5.3)
LYMPHOCYTES NFR BLD AUTO: 24.8 %
MCH RBC QN AUTO: 28.9 PG (ref 26.5–33)
MCHC RBC AUTO-ENTMCNC: 32.6 G/DL (ref 31.5–36.5)
MCV RBC AUTO: 89 FL (ref 78–100)
MONOCYTES # BLD AUTO: 0.9 10E9/L (ref 0–1.3)
MONOCYTES NFR BLD AUTO: 7.8 %
NEUTROPHILS # BLD AUTO: 7 10E9/L (ref 1.6–8.3)
NEUTROPHILS NFR BLD AUTO: 63.6 %
PLATELET # BLD AUTO: 223 10E9/L (ref 150–450)
RBC # BLD AUTO: 4.77 10E12/L (ref 3.8–5.2)
RETICS # AUTO: 56.3 10E9/L (ref 25–95)
RETICS/RBC NFR AUTO: 1.2 % (ref 0.5–2)
WBC # BLD AUTO: 11 10E9/L (ref 4–11)

## 2018-05-09 PROCEDURE — 36415 COLL VENOUS BLD VENIPUNCTURE: CPT | Performed by: INTERNAL MEDICINE

## 2018-05-09 PROCEDURE — 99204 OFFICE O/P NEW MOD 45 MIN: CPT | Performed by: INTERNAL MEDICINE

## 2018-05-09 PROCEDURE — 85045 AUTOMATED RETICULOCYTE COUNT: CPT | Performed by: INTERNAL MEDICINE

## 2018-05-09 PROCEDURE — 85025 COMPLETE CBC W/AUTO DIFF WBC: CPT | Performed by: INTERNAL MEDICINE

## 2018-05-09 PROCEDURE — 85060 BLOOD SMEAR INTERPRETATION: CPT | Performed by: INTERNAL MEDICINE

## 2018-05-09 ASSESSMENT — PAIN SCALES - GENERAL: PAINLEVEL: NO PAIN (0)

## 2018-05-09 NOTE — LETTER
5/9/2018         RE: Debi Young  [address] [place] [address]        Dear Colleague,    Thank you for referring your patient, Debi Young, to the [facility]. Please see a copy of my visit note below.    Hematology initial visit:  Date on this visit: 5/9/2018    Debi Young  is referred by Dr.Neil Lloyd Griggs for a hematology consultation. She requires evaluation for MRI abnormalities in the calvarium    Primary Physician: No Ref-Primary, Physician     History Of Present Illness:  Ms. Young is a 57 year old female who started having near syncope is spells over the last several months and she was being worked up for these and had MRI of the brain which incidentally showed Diffuse T1 hypointensity throughout the calvarium which may be related to red marrow reconversion. CBC with differential in March 2018 was essentially unremarkable except for mildly low eosinophils.  She is being worked up for her near syncope episodes by neurology as well as cardiology. She tells me that she had not had that his spells over the last couple of weeks. Other than that she is feeling well with decent energy and remains fully functional. Denies any pain. No erythromelalgia. No new swellings. No B symptoms. No recurrent infections. No trouble breathing. No bleeding. She has a history of eczema and allergic reactions to several environmental exposures and carries EpiPen with her. Off and on she has had softer stools which she controls with her diet. No nausea or vomiting. She is able to eat and drink well. No early satiety. She had a CT scan of abdomen and pelvis done last year which did not show any acute pathology.    ROS:  A comprehensive ROS was otherwise neg      Past Medical/Surgical History:  No past medical history on file.  No past surgical history on file.   Basal cell cancer  Asthma as a child  Allergies to multiple environmental exposures  Cholecystectomy  Bilateral  "knee arthroscopic surgeries  Left carpal tunnel surgery    Allergies:  Allergies as of 05/09/2018 - Iván as Reviewed 05/09/2018   Allergen Reaction Noted     Prednisone Anaphylaxis 05/30/2017     Naproxen  11/16/2009     No clinical screening - see comments  12/01/2010     Tape [adhesive tape] Dermatitis 04/19/2018     Amoxicillin-pot clavulanate Rash      Contrast dye  10/12/2007     Erythromycin Rash and Swelling      Sulfa drugs Rash      Sulfamethoxazole-trimethoprim Rash      Current Medications:  Current Outpatient Prescriptions   Medication Sig Dispense Refill     albuterol (PROAIR HFA/PROVENTIL HFA/VENTOLIN HFA) 108 (90 Base) MCG/ACT Inhaler Inhale 1-2 puffs into the lungs every 4 hours as needed       EPINEPHrine (EPIPEN/ADRENACLICK/OR ANY BX GENERIC EQUIV) 0.3 MG/0.3ML injection 2-pack Inject 0.3 mg into the muscle       ibuprofen (ADVIL/MOTRIN) 200 MG tablet         Family History:  No family history on file.  No family history of bleeding or clotting disorder.  Sr. had breast cancer at the age of 56. Father had a skin melanoma she has one child who is healthy.  Social History:  Social History     Social History     Marital status:      Spouse name: N/A     Number of children: N/A     Years of education: N/A     Occupational History     Not on file.     Social History Main Topics     Smoking status: Never Smoker     Smokeless tobacco: Never Used     Alcohol use Not on file     Drug use: Not on file     Sexual activity: Not on file     Other Topics Concern     Not on file     Social History Narrative    denies a smoking. Drinks alcohol occasionally. She lives with her . 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  Physical Exam:  /83  Pulse 72  Temp 98  F (36.7  C)  Resp 15  Ht 1.626 m (5' 4.02\")  Wt 108.4 kg (239 lb)  SpO2 99%  BMI 41 kg/m2  CONSTITUTIONAL: no acute distress  EYES: PERRLA, no palor or icterus.   ENT/MOUTH: no mouth lesions. " Oropharynx normal  CVS: s1s2 no m r g .   RESPIRATORY: clear to auscultation b/l  GI: soft non tender no hepatosplenomegaly  NEURO: AAOX3  Grossly non focal neuro exam  INTEGUMENT: no obvious rashes  LYMPHATIC: no palpable cervical, supraclavicular, axillary or inguinal LAD  MUSCULOSKELETAL: Unremarkable. No bony tenderness.   EXTREMITIES: no edema. On the left leg she has a hyperpigmented patch which looks like old healed scar from previous injuries. She tells me that it was a sore which got infected in the past when she scratched on it. Currently it does not look infected  PSYCH: Mentation, mood and affect are normal. Decision making capacity is intact      Laboratory/Imaging Studies    Reviewed  EXAM:  MRI OF THE BRAIN WITH AND WITHOUT CONTRAST    CLINICAL INFORMATION:   Age:  57 years old   Female patient presents with lightheadedness, dizzy spells, and headaches.    TECHNICAL INFORMATION:  1.5 Rebecca MRI was utilized to obtain multiplanar short and long TR   sequences of the brain before and after intravenous 20 mL of Dotarem without complications.    SEDATION: None.    COMPARISON: None    INTERPRETATION:  Note is made of a benign perivascular space involving the posterior left   putamen.      Parenchymal volume is normal for age.  Ventricles are normal in size, shape and configuration.    There is no acute infarct, intracranial hemorrhage or evidence of mass.  No abnormal   parenchymal, dural or leptomeningeal enhancement.  There is no extra-axial fluid collection.    Flow-voids at the base of brain are intact.  Dural sinuses show normal flow-voids and   enhancement.  Cerebellar tonsils extend to the level of the foramen magnum without Chiari I   malformation.  Configuration of the pituitary gland is normal.      Incidental note of a benign developmental venous anomaly involving the left parietal lobe.       Diffuse T1 hypointensity throughout the calvarium.  There is no paranasal sinus fluid or   mucosal  thickening.  Mild amount of fluid scattered throughout the right mastoid air cells.    Orbits are normal.  Soft tissues are unremarkable.  Temporomandibular joints are unremarkable.     CONCLUSION:    1.  No acute intracranial abnormality or abnormal intracranial enhancement.  2.  Diffuse T1 hypointensity throughout the calvarium which may be related to red marrow   reconversion.  This could relate to chronic anemia, heart failure, or heavy smoking.    Polycythemia vera, granulomatous processes, and hematologic malignancies can also give a   similar imaging appearance.  3.  Incidental note of a prominent perivascular space involving the left putamen.  4.  Mild amount of fluid involving the right mastoid air cells may relate to mastoiditis versus   chronic eustachian tube dysfunction.    ASSESSMENT/PLAN:    The findings of the bone marrow noted on the MRI are nonspecific. Currently she is not giving me any history of concerning symptoms. A couple of months ago CBC was unremarkable. Although my suspicion for an underlying serious hematological malignancy is low but I would like to check a peripheral blood smear to look for any abnormal cells. If there are any suspicious findings on the peripheral blood morphology then we can proceed with bone marrow biopsy.    We discussed what bone marrow biopsy entails and we discussed the procedure as well as its potential complications so that she is aware of those.    If on the other hand the above-mentioned workup remains unremarkable then at this time I would recommend serial checks on her CBC/differential to monitor her blood counts closely and if there is any significant change from her baseline then we can work it up more extensively.  In that instance she can follow with her primary care physician and see me on an as-needed basis    I would recommend that she continue to follow through with her other workup as deemed necessary by cardiology/neurology for the near syncope  episodes.    I answered all of her questions to her satisfaction and she is agreeable and comfortable with this plan    Edd Laws            Again, thank you for allowing me to participate in the care of your patient.        Sincerely,        Edd Laws MD

## 2018-05-09 NOTE — NURSING NOTE
"Oncology Rooming Note    May 9, 2018 12:26 PM   Debi Young is a 57 year old female who presents for:    Chief Complaint   Patient presents with     Oncology Clinic Visit     new patient     Initial Vitals: /83  Pulse 72  Temp 98  F (36.7  C)  Resp 15  Ht 1.626 m (5' 4.02\")  Wt 108.4 kg (239 lb)  SpO2 99%  BMI 41 kg/m2 Estimated body mass index is 41 kg/(m^2) as calculated from the following:    Height as of this encounter: 1.626 m (5' 4.02\").    Weight as of this encounter: 108.4 kg (239 lb). Body surface area is 2.21 meters squared.  No Pain (0) Comment: Data Unavailable   No LMP recorded.  Allergies reviewed: Yes  Medications reviewed: Yes    Medications: Medication refills not needed today.  Pharmacy name entered into [facility]: [id number] - [place], [place] - [address] AT [address] [place] [address] & [facility]        5 minutes for nursing intake (face to face time)     Kelley Cheek LPN              "

## 2018-05-09 NOTE — PROGRESS NOTES
Hematology initial visit:  Date on this visit: 5/9/2018    Debi Young  is referred by Dr.Neil Lloyd Griggs for a hematology consultation. She requires evaluation for MRI abnormalities in the calvarium    Primary Physician: No Ref-Primary, Physician     History Of Present Illness:  Ms. Young is a 57 year old female who started having near syncope is spells over the last several months and she was being worked up for these and had MRI of the brain which incidentally showed Diffuse T1 hypointensity throughout the calvarium which may be related to red marrow reconversion. CBC with differential in March 2018 was essentially unremarkable except for mildly low eosinophils.  She is being worked up for her near syncope episodes by neurology as well as cardiology. She tells me that she had not had that his spells over the last couple of weeks. Other than that she is feeling well with decent energy and remains fully functional. Denies any pain. No erythromelalgia. No new swellings. No B symptoms. No recurrent infections. No trouble breathing. No bleeding. She has a history of eczema and allergic reactions to several environmental exposures and carries EpiPen with her. Off and on she has had softer stools which she controls with her diet. No nausea or vomiting. She is able to eat and drink well. No early satiety. She had a CT scan of abdomen and pelvis done last year which did not show any acute pathology.    ROS:  A comprehensive ROS was otherwise neg      Past Medical/Surgical History:  No past medical history on file.  No past surgical history on file.   Basal cell cancer  Asthma as a child  Allergies to multiple environmental exposures  Cholecystectomy  Bilateral knee arthroscopic surgeries  Left carpal tunnel surgery    Allergies:  Allergies as of 05/09/2018 - Iván as Reviewed 05/09/2018   Allergen Reaction Noted     Prednisone Anaphylaxis 05/30/2017     Naproxen  11/16/2009     No clinical screening - see  "comments  12/01/2010     Tape [adhesive tape] Dermatitis 04/19/2018     Amoxicillin-pot clavulanate Rash      Contrast dye  10/12/2007     Erythromycin Rash and Swelling      Sulfa drugs Rash      Sulfamethoxazole-trimethoprim Rash      Current Medications:  Current Outpatient Prescriptions   Medication Sig Dispense Refill     albuterol (PROAIR HFA/PROVENTIL HFA/VENTOLIN HFA) 108 (90 Base) MCG/ACT Inhaler Inhale 1-2 puffs into the lungs every 4 hours as needed       EPINEPHrine (EPIPEN/ADRENACLICK/OR ANY BX GENERIC EQUIV) 0.3 MG/0.3ML injection 2-pack Inject 0.3 mg into the muscle       ibuprofen (ADVIL/MOTRIN) 200 MG tablet         Family History:  No family history on file.  No family history of bleeding or clotting disorder.  Sr. had breast cancer at the age of 57. Father had a skin melanoma she has one child who is healthy.  Social History:  Social History     Social History     Marital status:      Spouse name: N/A     Number of children: N/A     Years of education: N/A     Occupational History     Not on file.     Social History Main Topics     Smoking status: Never Smoker     Smokeless tobacco: Never Used     Alcohol use Not on file     Drug use: Not on file     Sexual activity: Not on file     Other Topics Concern     Not on file     Social History Narrative    denies a smoking. Drinks alcohol occasionally. She lives with her . 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  Physical Exam:  /83  Pulse 72  Temp 98  F (36.7  C)  Resp 15  Ht 1.626 m (5' 4.02\")  Wt 108.4 kg (239 lb)  SpO2 99%  BMI 41 kg/m2  CONSTITUTIONAL: no acute distress  EYES: PERRLA, no palor or icterus.   ENT/MOUTH: no mouth lesions. Oropharynx normal  CVS: s1s2 no m r g .   RESPIRATORY: clear to auscultation b/l  GI: soft non tender no hepatosplenomegaly  NEURO: AAOX3  Grossly non focal neuro exam  INTEGUMENT: no obvious rashes  LYMPHATIC: no palpable cervical, supraclavicular, " axillary or inguinal LAD  MUSCULOSKELETAL: Unremarkable. No bony tenderness.   EXTREMITIES: no edema. On the left leg she has a hyperpigmented patch which looks like old healed scar from previous injuries. She tells me that it was a sore which got infected in the past when she scratched on it. Currently it does not look infected  PSYCH: Mentation, mood and affect are normal. Decision making capacity is intact      Laboratory/Imaging Studies    Reviewed  EXAM:  MRI OF THE BRAIN WITH AND WITHOUT CONTRAST    CLINICAL INFORMATION:   Age:  57 years old   Female patient presents with lightheadedness, dizzy spells, and headaches.    TECHNICAL INFORMATION:  1.5 Rebecca MRI was utilized to obtain multiplanar short and long TR   sequences of the brain before and after intravenous 20 mL of Dotarem without complications.    SEDATION: None.    COMPARISON: None    INTERPRETATION:  Note is made of a benign perivascular space involving the posterior left   putamen.      Parenchymal volume is normal for age.  Ventricles are normal in size, shape and configuration.    There is no acute infarct, intracranial hemorrhage or evidence of mass.  No abnormal   parenchymal, dural or leptomeningeal enhancement.  There is no extra-axial fluid collection.    Flow-voids at the base of brain are intact.  Dural sinuses show normal flow-voids and   enhancement.  Cerebellar tonsils extend to the level of the foramen magnum without Chiari I   malformation.  Configuration of the pituitary gland is normal.      Incidental note of a benign developmental venous anomaly involving the left parietal lobe.       Diffuse T1 hypointensity throughout the calvarium.  There is no paranasal sinus fluid or   mucosal thickening.  Mild amount of fluid scattered throughout the right mastoid air cells.    Orbits are normal.  Soft tissues are unremarkable.  Temporomandibular joints are unremarkable.     CONCLUSION:    1.  No acute intracranial abnormality or abnormal  intracranial enhancement.  2.  Diffuse T1 hypointensity throughout the calvarium which may be related to red marrow   reconversion.  This could relate to chronic anemia, heart failure, or heavy smoking.    Polycythemia vera, granulomatous processes, and hematologic malignancies can also give a   similar imaging appearance.  3.  Incidental note of a prominent perivascular space involving the left putamen.  4.  Mild amount of fluid involving the right mastoid air cells may relate to mastoiditis versus   chronic eustachian tube dysfunction.    ASSESSMENT/PLAN:    The findings of the bone marrow noted on the MRI are nonspecific. Currently she is not giving me any history of concerning symptoms. A couple of months ago CBC was unremarkable. Although my suspicion for an underlying serious hematological malignancy is low but I would like to check a peripheral blood smear to look for any abnormal cells. If there are any suspicious findings on the peripheral blood morphology then we can proceed with bone marrow biopsy.    We discussed what bone marrow biopsy entails and we discussed the procedure as well as its potential complications so that she is aware of those.    If on the other hand the above-mentioned workup remains unremarkable then at this time I would recommend serial checks on her CBC/differential to monitor her blood counts closely and if there is any significant change from her baseline then we can work it up more extensively.  In that instance she can follow with her primary care physician and see me on an as-needed basis    I would recommend that she continue to follow through with her other workup as deemed necessary by cardiology/neurology for the near syncope episodes.    I answered all of her questions to her satisfaction and she is agreeable and comfortable with this plan    Edd Lasw

## 2018-05-09 NOTE — MR AVS SNAPSHOT
After Visit Summary   5/9/2018    Debi Young    MRN: [id number]           Patient Information     Date Of Birth          1960        Visit Information        Provider Department      5/9/2018 12:45 PM Edd Laws MD [facility]        Today's Diagnoses     Pre-syncope    -  1      Care Instructions    Labs today    See me as needed            Follow-ups after your visit        Who to contact     If you have questions or need follow up information about today's clinic visit or your schedule please contact [facility] directly at [phone].  Normal or non-critical lab and imaging results will be communicated to you by MedArkivehart, letter or phone within 4 business days after the clinic has received the results. If you do not hear from us within 7 days, please contact the clinic through MedArkivehart or phone. If you have a critical or abnormal lab result, we will notify you by phone as soon as possible.  Submit refill requests through VisConPro or call your pharmacy and they will forward the refill request to us. Please allow 3 business days for your refill to be completed.          Additional Information About Your Visit        MyChart Information     VisConPro gives you secure access to your electronic health record. If you see a primary care provider, you can also send messages to your care team and make appointments. If you have questions, please call your primary care clinic.  If you do not have a primary care provider, please call [phone] and they will assist you.      VisConPro is an electronic gateway that provides easy, online access to your medical records. With VisConPro, you can request a clinic appointment, read your test results, renew a prescription or communicate with your care team.     To access your existing account, please contact your [facility] or call [phone] for assistance.        Care EveryWhere ID      "This is your Care EveryWhere ID. This could be used by other organizations to access your [place] medical records  [id number]        Your Vitals Were     Pulse Temperature Respirations Height Pulse Oximetry BMI (Body Mass Index)    72 98  F (36.7  C) 15 1.626 m (5' 4.02\") 99% 41 kg/m2       Blood Pressure from Last 3 Encounters:   05/09/18 137/83   04/30/18 (!) 151/91   04/17/18 142/87    Weight from Last 3 Encounters:   05/09/18 108.4 kg (239 lb)   04/30/18 110.2 kg (243 lb)   04/17/18 109.5 kg (241 lb 8 oz)              We Performed the Following     Blood Morphology Pathologist Review     CBC with platelets differential     Reticulocyte Count        Primary Care Provider Fax #    Physician No Ref-Primary [phone]       No address on file        Equal Access to Services     MESHA WORKMAN : Christa Tran, tiago arredondo, rubin rand, mirna kent . So [facility] [phone].    ATENCIÓN: Si habla español, tiene a garcia disposición servicios gratuitos de asistencia lingüística. Llame al [phone].    We comply with applicable federal civil rights laws and [place] laws. We do not discriminate on the basis of race, color, national origin, age, disability, sex, sexual orientation, or gender identity.            Thank you!     Thank you for choosing [facility]  for your care. Our goal is always to provide you with excellent care. Hearing back from our patients is one way we can continue to improve our services. Please take a few minutes to complete the written survey that you may receive in the mail after your visit with us. Thank you!             Your Updated Medication List - Protect others around you: Learn how to safely use, store and throw away your medicines at www.disposemymeds.org.          This list is accurate as of 5/9/18  1:17 PM.  Always use your most recent med list.                   Brand Name Dispense Instructions for use " Diagnosis    albuterol 108 (90 Base) MCG/ACT Inhaler    PROAIR HFA/PROVENTIL HFA/VENTOLIN HFA     Inhale 1-2 puffs into the lungs every 4 hours as needed        EPINEPHrine 0.3 MG/0.3ML injection 2-pack    EPIPEN/ADRENACLICK/or ANY BX GENERIC EQUIV     Inject 0.3 mg into the muscle        ibuprofen 200 MG tablet    ADVIL/MOTRIN

## 2018-05-10 LAB — COPATH REPORT: NORMAL

## 2018-05-15 ENCOUNTER — TELEPHONE (OUTPATIENT)
Dept: CARDIOLOGY | Facility: CLINIC | Age: 58
End: 2018-05-15

## 2018-05-15 NOTE — TELEPHONE ENCOUNTER
Called and spoke to patient. Dr Laws sent Dr Collier a note regarding his recommendations. Dr Collier would like the echo and heart monitor rescheduled. Connected patient to scheduling.

## 2018-05-15 NOTE — TELEPHONE ENCOUNTER
Returned call to pt. Pt informed writer that Dr. Laws's not is now in [facility]. Pt Is requesting Dr. Collier to review the notes and advise whether she should complete the ordered cardiac tests or what she should do for further follow up/testing. Informed pt I would send message to Dr. Collier asking for him to review Dr. Laws's note. Pt requests he give recommendations sooner than later. Informed her we would call when we hear back. Pt agreed to this and will await callback.

## 2018-05-15 NOTE — TELEPHONE ENCOUNTER
[facility] Call Center    Phone Message    May a detailed message be left on voicemail: no    Reason for Call: Other: Pt asking for [place] in cardiology.  Please return call.      Action Taken: Message routed to:  Adult Clinics: Cardiology p [id number]

## 2018-05-16 ENCOUNTER — CARE COORDINATION (OUTPATIENT)
Dept: NEUROLOGY | Facility: CLINIC | Age: 58
End: 2018-05-16

## 2018-05-16 NOTE — PROGRESS NOTES
Imaging received from [facility] and sent to the film room to be up-loaded into PACS.

## 2018-05-21 ENCOUNTER — RADIANT APPOINTMENT (OUTPATIENT)
Dept: CARDIOLOGY | Facility: CLINIC | Age: 58
End: 2018-05-21
Attending: INTERNAL MEDICINE
Payer: COMMERCIAL

## 2018-05-21 DIAGNOSIS — R55 PRE-SYNCOPE: ICD-10-CM

## 2018-05-21 PROCEDURE — 93306 TTE W/DOPPLER COMPLETE: CPT

## 2018-05-21 PROCEDURE — 93270 REMOTE 30 DAY ECG REV/REPORT: CPT

## 2018-05-21 NOTE — MR AVS SNAPSHOT
MRN:[id number]                      After Visit Summary   5/21/2018    Debi Young    MRN: [id number]           Visit Information        Provider Department      5/21/2018 3:30 PM MG CV TECH; MG DEVICE RM [facility]        Your next 10 appointments already scheduled     May 21, 2018  3:30 PM CDT   Event Monitor with MG DEVICE RM, MG CV TECH   [facility] ([facility])    [address] [place] [id number] [phone]              MyChart Information     [employer] gives you secure access to your electronic health record. If you see a primary care provider, you can also send messages to your care team and make appointments. If you have questions, please call your primary care clinic.  If you do not have a primary care provider, please call [phone] and they will assist you.      UserZoom is an electronic gateway that provides easy, online access to your medical records. With UserZoom, you can request a clinic appointment, read your test results, renew a prescription or communicate with your care team.     To access your existing account, please contact your [facility] or call [phone] for assistance.        Care EveryWhere ID     This is your Care EveryWhere ID. This could be used by other organizations to access your [place] medical records  [id number]        Equal Access to Services     MESHA WORKMAN AH: Hadii flora rodgers Sojane, waaxda luqadaha, qaybta kaalmada giorgi, mirna valdes. So [facility] [phone].    ATENCIÓN: Si habla español, tiene a garcia disposición servicios gratrichardos de asistencia lingüística. Llame al [phone].    We comply with applicable federal civil rights laws and [place] laws. We do not discriminate on the basis of race, color, national origin, age, disability, sex, sexual orientation, or gender identity.

## 2018-05-21 NOTE — PROGRESS NOTES
"Patient presents for event heart monitor placement ordered by MD.  Patient was hooked up and patient instructions were given regarding electrode placement and how to use phone.  Patient was instructed on how long to wear monitor, how to send a \"manual event\", and how to properly send the unit back. [employer] contact information was provided to patient. Patient education provided about cardiology interpretation and primary provider will be notified of results.    Diagnosis taken from MD order.    Gail Agosto, CCT, Cardiac CMA    "

## 2018-05-25 ENCOUNTER — TELEPHONE (OUTPATIENT)
Dept: CARDIOLOGY | Facility: CLINIC | Age: 58
End: 2018-05-25

## 2018-05-25 NOTE — TELEPHONE ENCOUNTER
Spoke to [employer] representative who stated that she received a PA request for the Cardionet Monitor. [facility] is asking that she have the MCOT placed. Informed her that patient is already wearing Cardionet Monitor and I will look into why the Cardionet monitor was placed versus the MCOT monitor. They would like to know the MDs intent on why the Cardionet was needed over the MCOT.     Will contact [employer] back when additional information is known. Message sent to Gwen Brannon to advise further.    Daysi Fuentes, RN, BSN  Nephrology Care Coordinator  [facility]

## 2018-05-25 NOTE — TELEPHONE ENCOUNTER
Reason for call:  Other   Patient called regarding (reason for call): [facility] calling about Amira pt.  Verification on what 30 day monitoring physician wants for patient.   Additional comments:     Phone number to reach patient:  Other phone number:  [phone]  Jane    Best Time:  any    Can we leave a detailed message on this number?  YES

## 2018-05-30 NOTE — TELEPHONE ENCOUNTER
Spoke with Jane who had some questions about billing that I could not answer. Transferred her to Gail in the ECG department who spoke with her. She was told that since the patient had a holter done already , the current monitor she is wearing will be covered by [facility].

## 2021-01-03 ENCOUNTER — HEALTH MAINTENANCE LETTER (OUTPATIENT)
Age: 61
End: 2021-01-03

## 2021-10-10 ENCOUNTER — HEALTH MAINTENANCE LETTER (OUTPATIENT)
Age: 61
End: 2021-10-10

## 2022-01-29 ENCOUNTER — HEALTH MAINTENANCE LETTER (OUTPATIENT)
Age: 62
End: 2022-01-29

## 2022-09-18 ENCOUNTER — HEALTH MAINTENANCE LETTER (OUTPATIENT)
Age: 62
End: 2022-09-18

## 2023-01-28 ENCOUNTER — HEALTH MAINTENANCE LETTER (OUTPATIENT)
Age: 63
End: 2023-01-28

## 2023-05-06 ENCOUNTER — HEALTH MAINTENANCE LETTER (OUTPATIENT)
Age: 63
End: 2023-05-06